# Patient Record
Sex: MALE | Race: WHITE | NOT HISPANIC OR LATINO | Employment: OTHER | URBAN - METROPOLITAN AREA
[De-identification: names, ages, dates, MRNs, and addresses within clinical notes are randomized per-mention and may not be internally consistent; named-entity substitution may affect disease eponyms.]

---

## 2023-04-11 LAB
ANION GAP IN SER/PLAS: 11 MMOL/L (ref 10–20)
BASOPHILS (10*3/UL) IN BLOOD BY AUTOMATED COUNT: 0.06 X10E9/L (ref 0–0.1)
BASOPHILS/100 LEUKOCYTES IN BLOOD BY AUTOMATED COUNT: 0.9 % (ref 0–2)
CALCIUM (MG/DL) IN SER/PLAS: 9.5 MG/DL (ref 8.6–10.3)
CARBON DIOXIDE, TOTAL (MMOL/L) IN SER/PLAS: 30 MMOL/L (ref 21–32)
CHLORIDE (MMOL/L) IN SER/PLAS: 101 MMOL/L (ref 98–107)
CREATININE (MG/DL) IN SER/PLAS: 1.27 MG/DL (ref 0.5–1.3)
EOSINOPHILS (10*3/UL) IN BLOOD BY AUTOMATED COUNT: 0.14 X10E9/L (ref 0–0.4)
EOSINOPHILS/100 LEUKOCYTES IN BLOOD BY AUTOMATED COUNT: 2 % (ref 0–6)
ERYTHROCYTE DISTRIBUTION WIDTH (RATIO) BY AUTOMATED COUNT: 13.2 % (ref 11.5–14.5)
ERYTHROCYTE MEAN CORPUSCULAR HEMOGLOBIN CONCENTRATION (G/DL) BY AUTOMATED: 34.5 G/DL (ref 32–36)
ERYTHROCYTE MEAN CORPUSCULAR VOLUME (FL) BY AUTOMATED COUNT: 91 FL (ref 80–100)
ERYTHROCYTES (10*6/UL) IN BLOOD BY AUTOMATED COUNT: 4.85 X10E12/L (ref 4.5–5.9)
GFR MALE: 56 ML/MIN/1.73M2
GLUCOSE (MG/DL) IN SER/PLAS: 109 MG/DL (ref 74–99)
HEMATOCRIT (%) IN BLOOD BY AUTOMATED COUNT: 44.1 % (ref 41–52)
HEMOGLOBIN (G/DL) IN BLOOD: 15.2 G/DL (ref 13.5–17.5)
IMMATURE GRANULOCYTES/100 LEUKOCYTES IN BLOOD BY AUTOMATED COUNT: 0.4 % (ref 0–0.9)
LEUKOCYTES (10*3/UL) IN BLOOD BY AUTOMATED COUNT: 6.9 X10E9/L (ref 4.4–11.3)
LYMPHOCYTES (10*3/UL) IN BLOOD BY AUTOMATED COUNT: 2.08 X10E9/L (ref 0.8–3)
LYMPHOCYTES/100 LEUKOCYTES IN BLOOD BY AUTOMATED COUNT: 30.3 % (ref 13–44)
MAGNESIUM (MG/DL) IN SER/PLAS: 2.03 MG/DL (ref 1.6–2.4)
MONOCYTES (10*3/UL) IN BLOOD BY AUTOMATED COUNT: 0.62 X10E9/L (ref 0.05–0.8)
MONOCYTES/100 LEUKOCYTES IN BLOOD BY AUTOMATED COUNT: 9 % (ref 2–10)
NATRIURETIC PEPTIDE B (PG/ML) IN SER/PLAS: 128 PG/ML (ref 0–99)
NEUTROPHILS (10*3/UL) IN BLOOD BY AUTOMATED COUNT: 3.94 X10E9/L (ref 1.6–5.5)
NEUTROPHILS/100 LEUKOCYTES IN BLOOD BY AUTOMATED COUNT: 57.4 % (ref 40–80)
NRBC (PER 100 WBCS) BY AUTOMATED COUNT: 0 /100 WBC (ref 0–0)
PLATELETS (10*3/UL) IN BLOOD AUTOMATED COUNT: 188 X10E9/L (ref 150–450)
POTASSIUM (MMOL/L) IN SER/PLAS: 3.8 MMOL/L (ref 3.5–5.3)
SODIUM (MMOL/L) IN SER/PLAS: 138 MMOL/L (ref 136–145)
UREA NITROGEN (MG/DL) IN SER/PLAS: 23 MG/DL (ref 6–23)

## 2023-09-21 PROBLEM — H26.9 CATARACT: Status: ACTIVE | Noted: 2023-09-21

## 2023-09-21 PROBLEM — R79.89 ELEVATED BRAIN NATRIURETIC PEPTIDE (BNP) LEVEL: Status: ACTIVE | Noted: 2023-09-21

## 2023-09-21 PROBLEM — I50.20: Status: ACTIVE | Noted: 2023-09-21

## 2023-09-21 PROBLEM — N40.0 BPH (BENIGN PROSTATIC HYPERPLASIA): Status: ACTIVE | Noted: 2023-09-21

## 2023-09-21 PROBLEM — I63.9 CEREBROVASCULAR ACCIDENT (CVA) (MULTI): Status: ACTIVE | Noted: 2023-09-21

## 2023-09-21 PROBLEM — I10 BENIGN ESSENTIAL HYPERTENSION: Status: ACTIVE | Noted: 2023-09-21

## 2023-09-21 PROBLEM — G47.00 INSOMNIA: Status: ACTIVE | Noted: 2023-09-21

## 2023-09-21 PROBLEM — I47.29 VENTRICULAR TACHYCARDIA, NONSUSTAINED (MULTI): Status: ACTIVE | Noted: 2023-09-21

## 2023-09-21 PROBLEM — E78.5 HYPERLIPIDEMIA: Status: ACTIVE | Noted: 2023-09-21

## 2023-09-21 PROBLEM — I48.20 CHRONIC ATRIAL FIBRILLATION (MULTI): Status: ACTIVE | Noted: 2023-09-21

## 2023-09-21 PROBLEM — R53.83 FATIGUE: Status: ACTIVE | Noted: 2023-09-21

## 2023-09-21 PROBLEM — R26.89 LOSS OF BALANCE: Status: ACTIVE | Noted: 2023-09-21

## 2023-09-21 PROBLEM — I95.89 IATROGENIC HYPOTENSION: Status: ACTIVE | Noted: 2023-09-21

## 2023-09-21 PROBLEM — E87.6 HYPOKALEMIA: Status: ACTIVE | Noted: 2023-09-21

## 2023-09-21 PROBLEM — I25.10 ATHEROSCLEROSIS OF NATIVE CORONARY ARTERY WITHOUT ANGINA PECTORIS: Status: ACTIVE | Noted: 2023-09-21

## 2023-09-21 PROBLEM — R00.1 BRADYCARDIA: Status: ACTIVE | Noted: 2023-09-21

## 2023-09-21 PROBLEM — N39.0 FREQUENT UTI: Status: ACTIVE | Noted: 2023-09-21

## 2023-09-21 PROBLEM — Z86.79 HISTORY OF ISCHEMIC CARDIOMYOPATHY: Status: ACTIVE | Noted: 2023-09-21

## 2023-09-21 RX ORDER — SPIRONOLACTONE 25 MG/1
1 TABLET ORAL DAILY
COMMUNITY
Start: 2022-05-19 | End: 2024-01-04 | Stop reason: SDUPTHER

## 2023-09-21 RX ORDER — TAMSULOSIN HYDROCHLORIDE 0.4 MG/1
0.4 CAPSULE ORAL DAILY
COMMUNITY

## 2023-09-21 RX ORDER — CARVEDILOL 3.12 MG/1
3.12 TABLET ORAL 2 TIMES DAILY
COMMUNITY
End: 2023-12-22 | Stop reason: SINTOL

## 2023-09-21 RX ORDER — NITROFURANTOIN MACROCRYSTALS 50 MG/1
50 CAPSULE ORAL NIGHTLY
COMMUNITY

## 2023-09-21 RX ORDER — CLOPIDOGREL BISULFATE 75 MG/1
1 TABLET ORAL DAILY
COMMUNITY

## 2023-09-21 RX ORDER — TORSEMIDE 20 MG/1
1 TABLET ORAL DAILY
COMMUNITY
Start: 2022-06-14 | End: 2024-03-22 | Stop reason: SDUPTHER

## 2023-09-21 RX ORDER — MELATONIN 10 MG
10 TABLET, SUBLINGUAL SUBLINGUAL
COMMUNITY
End: 2023-12-22 | Stop reason: WASHOUT

## 2023-09-21 RX ORDER — ACETAMINOPHEN, DIPHENHYDRAMINE HCL, PHENYLEPHRINE HCL 325; 25; 5 MG/1; MG/1; MG/1
10 TABLET ORAL NIGHTLY
COMMUNITY

## 2023-09-21 RX ORDER — SIMVASTATIN 20 MG/1
20 TABLET, FILM COATED ORAL NIGHTLY
COMMUNITY
Start: 2016-08-23

## 2023-09-21 RX ORDER — LOSARTAN POTASSIUM 25 MG/1
1 TABLET ORAL DAILY
COMMUNITY
Start: 2022-05-16 | End: 2024-01-04 | Stop reason: SDUPTHER

## 2023-09-21 RX ORDER — SERTRALINE HYDROCHLORIDE 100 MG/1
100 TABLET, FILM COATED ORAL EVERY MORNING
COMMUNITY
Start: 2016-08-23

## 2023-09-21 RX ORDER — NITROGLYCERIN 0.4 MG/1
0.4 TABLET SUBLINGUAL EVERY 5 MIN PRN
COMMUNITY
Start: 2021-08-19

## 2023-12-22 ENCOUNTER — OFFICE VISIT (OUTPATIENT)
Dept: CARDIOLOGY | Facility: CLINIC | Age: 83
End: 2023-12-22
Payer: MEDICARE

## 2023-12-22 VITALS
HEART RATE: 54 BPM | TEMPERATURE: 97.4 F | SYSTOLIC BLOOD PRESSURE: 98 MMHG | DIASTOLIC BLOOD PRESSURE: 70 MMHG | WEIGHT: 184.6 LBS | HEIGHT: 64 IN | BODY MASS INDEX: 31.51 KG/M2

## 2023-12-22 DIAGNOSIS — I50.42 CHRONIC COMBINED SYSTOLIC AND DIASTOLIC HEART FAILURE (MULTI): ICD-10-CM

## 2023-12-22 DIAGNOSIS — Z87.891 FORMER SMOKER: ICD-10-CM

## 2023-12-22 DIAGNOSIS — E78.2 MIXED HYPERLIPIDEMIA: ICD-10-CM

## 2023-12-22 DIAGNOSIS — I48.20 CHRONIC ATRIAL FIBRILLATION (MULTI): ICD-10-CM

## 2023-12-22 DIAGNOSIS — I25.10 ATHEROSCLEROSIS OF NATIVE CORONARY ARTERY OF NATIVE HEART WITHOUT ANGINA PECTORIS: Primary | ICD-10-CM

## 2023-12-22 DIAGNOSIS — R53.82 CHRONIC FATIGUE: ICD-10-CM

## 2023-12-22 DIAGNOSIS — R26.89 LOSS OF BALANCE: ICD-10-CM

## 2023-12-22 DIAGNOSIS — I10 BENIGN ESSENTIAL HYPERTENSION: ICD-10-CM

## 2023-12-22 DIAGNOSIS — Z79.01 CHRONIC ANTICOAGULATION: ICD-10-CM

## 2023-12-22 DIAGNOSIS — I95.89 IATROGENIC HYPOTENSION: ICD-10-CM

## 2023-12-22 PROCEDURE — 99214 OFFICE O/P EST MOD 30 MIN: CPT | Performed by: INTERNAL MEDICINE

## 2023-12-22 PROCEDURE — 3074F SYST BP LT 130 MM HG: CPT | Performed by: INTERNAL MEDICINE

## 2023-12-22 PROCEDURE — 1160F RVW MEDS BY RX/DR IN RCRD: CPT | Performed by: INTERNAL MEDICINE

## 2023-12-22 PROCEDURE — 1159F MED LIST DOCD IN RCRD: CPT | Performed by: INTERNAL MEDICINE

## 2023-12-22 PROCEDURE — 3078F DIAST BP <80 MM HG: CPT | Performed by: INTERNAL MEDICINE

## 2023-12-22 ASSESSMENT — ENCOUNTER SYMPTOMS
FEVER: 0
DYSURIA: 0
ARTHRALGIAS: 1
CONSTIPATION: 1
ACTIVITY CHANGE: 1
WEAKNESS: 1
COUGH: 0
ABDOMINAL DISTENTION: 1
WHEEZING: 1
DIFFICULTY URINATING: 1
BRUISES/BLEEDS EASILY: 1
FATIGUE: 1
PALPITATIONS: 0
STRIDOR: 0
ENDOCRINE NEGATIVE: 1
HEMATURIA: 1
APPETITE CHANGE: 0
SHORTNESS OF BREATH: 1
DIZZINESS: 1
SLEEP DISTURBANCE: 1

## 2023-12-22 ASSESSMENT — PATIENT HEALTH QUESTIONNAIRE - PHQ9
7. TROUBLE CONCENTRATING ON THINGS, SUCH AS READING THE NEWSPAPER OR WATCHING TELEVISION: SEVERAL DAYS
5. POOR APPETITE OR OVEREATING: NOT AT ALL
4. FEELING TIRED OR HAVING LITTLE ENERGY: NEARLY EVERY DAY
SUM OF ALL RESPONSES TO PHQ QUESTIONS 1-9: 17
2. FEELING DOWN, DEPRESSED OR HOPELESS: NEARLY EVERY DAY
8. MOVING OR SPEAKING SO SLOWLY THAT OTHER PEOPLE COULD HAVE NOTICED. OR THE OPPOSITE, BEING SO FIGETY OR RESTLESS THAT YOU HAVE BEEN MOVING AROUND A LOT MORE THAN USUAL: NEARLY EVERY DAY
1. LITTLE INTEREST OR PLEASURE IN DOING THINGS: NEARLY EVERY DAY
6. FEELING BAD ABOUT YOURSELF - OR THAT YOU ARE A FAILURE OR HAVE LET YOURSELF OR YOUR FAMILY DOWN: NOT AT ALL
10. IF YOU CHECKED OFF ANY PROBLEMS, HOW DIFFICULT HAVE THESE PROBLEMS MADE IT FOR YOU TO DO YOUR WORK, TAKE CARE OF THINGS AT HOME, OR GET ALONG WITH OTHER PEOPLE: NOT DIFFICULT AT ALL
SUM OF ALL RESPONSES TO PHQ9 QUESTIONS 1 AND 2: 6
3. TROUBLE FALLING OR STAYING ASLEEP OR SLEEPING TOO MUCH: NEARLY EVERY DAY
9. THOUGHTS THAT YOU WOULD BE BETTER OFF DEAD, OR OF HURTING YOURSELF: SEVERAL DAYS

## 2023-12-22 NOTE — PROGRESS NOTES
"  Patient:  Sravan Morin  YOB: 1940  MRN: [id number]       Chief Complaint/Active Symptoms:       Sravan Morin is a 83 y.o. male who returns today for cardiac follow-up.    Here for 8 month follow-up of chronic heart failure. Remains tired and feels \"drunk\" or foggy with his thoughts. Feels he is not alert or in a daze. This is constant unless he is laying in bed relaxing. Feels like also staggers when he walks.     No chest pain. Chronic stable SOB on exertion, no orthopnea or PND. No edema.     Review of Systems   Constitutional:  Positive for activity change and fatigue. Negative for appetite change and fever.   HENT:  Positive for congestion and hearing loss.    Respiratory:  Positive for shortness of breath and wheezing. Negative for cough and stridor.    Cardiovascular:  Negative for chest pain, palpitations and leg swelling.   Gastrointestinal:  Positive for abdominal distention and constipation.   Endocrine: Negative.    Genitourinary:  Positive for difficulty urinating and hematuria. Negative for dysuria.   Musculoskeletal:  Positive for arthralgias.   Neurological:  Positive for dizziness and weakness.   Hematological:  Bruises/bleeds easily.   Psychiatric/Behavioral:  Positive for sleep disturbance.    All other systems reviewed and are negative.      Objective:     Vitals:    12/22/23 0943   BP: 98/70   Pulse: 54   Temp: 36.3 °C (97.4 °F)       Vitals:    12/22/23 0943   Weight: 83.7 kg (184 lb 9.6 oz)       Allergies:     Allergies   Allergen Reactions    Penicillins Hives     70 years ago    Sulfamethoxazole-Trimethoprim Other     Caused liver damage          Medications:     Current Outpatient Medications   Medication Instructions    apixaban (ELIQUIS) 5 mg, oral, Every 12 hours    carvedilol (COREG) 3.125 mg, oral, 2 times daily    clopidogrel (Plavix) 75 mg tablet 1 tablet, oral, Daily    famotidine (PEPCID ORAL) oral    L. acidophilus/Bifid. animalis 15.5 billion cell capsule 1 " Capful, oral, Every morning    L. acidophilus/Bifid. animalis 32 billion cell capsule 1 capsule, oral, PER DIRECTED    losartan (Cozaar) 25 mg tablet 1 tablet, oral, Daily    melatonin 10 mg, oral, Nightly    melatonin 10 mg, sublingual, TAKE PER DIRECTED 30 MIN BEFORE BED    nitrofurantoin (MACRODANTIN) 50 mg, oral, Nightly, TAKE PER DIRECTED    nitroglycerin (NITROSTAT) 0.4 mg, sublingual, Every 5 min PRN, CALL 911 IF PERSISTS   PER DIRECTED    raNITIdine (ZANTAC) 150 mg, oral, Nightly    sertraline (ZOLOFT) 100 mg, oral, Every morning    simvastatin (ZOCOR) 20 mg, oral, Nightly    spironolactone (Aldactone) 25 mg tablet 1 tablet, oral, Daily    tamsulosin (FLOMAX) 0.4 mg, oral, Daily, TAKE PER DIRECTED    torsemide (Demadex) 20 mg tablet 1 tablet, oral, Daily       Physical Examination:   GENERAL:  Well developed, well nourished, in no acute distress.  HEENT: NC AT, EOMI with anicteric sclera  NECK: Reduced range of motion no JVD, no bruit.  CHEST:  Symmetric and nontender.  LUNGS:  Clear to auscultation bilaterally, normal respiratory effort.  HEART: PMI is nondisplaced.  There is an irregularly irregular rhythm with controlled rate, normal S1 and S2 without S3.  There are no carotid bruits.  No peripheral edema with preserved distal pulses.  ABDOMEN: Soft, NT, ND without palpable organomegaly or bruits  EXTREMITIES:  Warm with good color, no clubbing or cyanosis.  There is no edema noted.  PERIPHERAL VASCULAR:  Pulses present and equally palpable  MUSCULOSKELETAL: Osteoarthritic changes  NEURO/PSYCH:  Alert and oriented times three with approppriate behavior and responses. Nonfocal motor examination with balance abnormalities lymph: No significant palpable lymphadenopathy  Skin: no rash or lesions on exposed skin or reported.    Lab:     CBC:   Lab Results   Component Value Date    WBC 6.9 04/11/2023    RBC 4.85 04/11/2023    HGB 15.2 04/11/2023    HCT 44.1 04/11/2023     04/11/2023        CMP:    Lab  "Results   Component Value Date     04/11/2023    K 3.8 04/11/2023     04/11/2023    CO2 30 04/11/2023    BUN 23 04/11/2023    CREATININE 1.27 04/11/2023    GLUCOSE 109 (H) 04/11/2023    CALCIUM 9.5 04/11/2023       Magnesium:    Lab Results   Component Value Date    MG 2.03 04/11/2023       Lipid Profile:    Lab Results   Component Value Date    TRIG 77 11/30/2022    HDL 48.6 11/30/2022       TSH:    Lab Results   Component Value Date    TSH 1.14 11/10/2020       BNP:   Lab Results   Component Value Date     (H) 04/11/2023        PT/INR:    Lab Results   Component Value Date    PROTIME 15.9 (H) 11/29/2022    INR 1.4 (H) 11/29/2022       HgBA1c:    Lab Results   Component Value Date    HGBA1C 5.2 11/30/2022       BMP:  Lab Results   Component Value Date     04/11/2023     11/30/2022     11/29/2022    K 3.8 04/11/2023    K 3.9 11/30/2022    K 3.8 11/29/2022     04/11/2023     11/30/2022     11/29/2022    CO2 30 04/11/2023    CO2 25 11/30/2022    CO2 28 11/29/2022    BUN 23 04/11/2023    BUN 15 11/30/2022    BUN 19 11/29/2022    CREATININE 1.27 04/11/2023    CREATININE 1.06 11/30/2022    CREATININE 1.34 (H) 11/29/2022       Cardiac Enzymes:    Lab Results   Component Value Date    TROPHS 34 (H) 11/29/2022    TROPHS 34 (H) 11/29/2022    TROPHS 39 (H) 11/29/2022       Hepatic Function Panel:    Lab Results   Component Value Date    ALKPHOS 91 11/29/2022    ALT 20 11/29/2022    AST 33 11/29/2022    PROT 7.2 11/29/2022    BILITOT 1.0 11/29/2022         Diagnostic Studies:     No echocardiogram results found for the past 12 months    No nuclear medicine results found for the past 12 months    No valid procedures specified.    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  Chronic combined systolic and diastolic congestive heart failure.  Compensated with borderline blood pressure readings which have been a problem throughout his treatment.  For now we will try moving his tamsulosin to bedtime to see if that helps with his symptoms if not we may need to discontinue the losartan therapy.  2.  Coronary artery disease stable without angina pectoris.  Please continue with risk factor modification.  Not on aspirin therapy due to the patient's chronic anticoagulation.  Will stop his Plavix 1 year after his stents.  3.  History of hypertension.  The patient does not have hypertension anymore he is intolerant to many medications.  Unfortunately due to his low blood pressure readings he is not able to tolerate some of the therapy we would prefer to give him for the treatment of his heart failure.  4.  Hyperlipidemia.  Continue statin therapy.  5.  Iatrogenic hypotension.  Due to the medications.  Will try moving his tamsulosin but we may be forced to if he continues to be at risk for falls to discontinue the losartan therapy.  6.  Fatigue and loss of balance.  I think his fatigue is multifactorial and the loss of balance sounds like it could be due to orthostatic hypotension and his resting systolic hypotension.  Please see the above recommendations.  7.  Tobacco and weight status.  The patient does not smoke cigarettes and he has recently stopped his chewing tobacco.  We reminded him to be tobacco free.    I will see the patient in follow-up in 3 months but I have asked he and his family to call me if he remains hypotensive and having difficulty after moving his medications as I discussed above.

## 2023-12-22 NOTE — PATIENT INSTRUCTIONS
Take tamsulosin at bedtime  Check blood pressure 2-3 times a week at lunchtime - bring readings to your next appointment. Call me if your blood pressure readings are always < 100  Return in 3 months

## 2024-01-04 DIAGNOSIS — I50.20 HEART FAILURE WITH REDUCED EJECTION FRACTION, NYHA CLASS III (MULTI): Primary | ICD-10-CM

## 2024-01-05 RX ORDER — LOSARTAN POTASSIUM 25 MG/1
25 TABLET ORAL DAILY
Qty: 90 TABLET | Refills: 1 | Status: SHIPPED | OUTPATIENT
Start: 2024-01-05 | End: 2024-07-03

## 2024-01-05 RX ORDER — SPIRONOLACTONE 25 MG/1
25 TABLET ORAL DAILY
Qty: 90 TABLET | Refills: 1 | Status: SHIPPED | OUTPATIENT
Start: 2024-01-05 | End: 2024-07-03

## 2024-01-29 ENCOUNTER — TELEPHONE (OUTPATIENT)
Dept: CARDIOLOGY | Facility: CLINIC | Age: 84
End: 2024-01-29
Payer: MEDICARE

## 2024-01-29 RX ORDER — CLOPIDOGREL BISULFATE 75 MG/1
75 TABLET ORAL DAILY
OUTPATIENT
Start: 2024-01-29

## 2024-01-29 NOTE — TELEPHONE ENCOUNTER
----- Message from Maria D Wilson MD sent at 1/29/2024  4:38 PM EST -----  Patient has completed > 1 year of plavix. Does not need to continue while he is on eliquis. Did not renew. May stop when current supply gone.

## 2024-02-19 DIAGNOSIS — I48.20 CHRONIC ATRIAL FIBRILLATION (MULTI): Primary | ICD-10-CM

## 2024-03-22 ENCOUNTER — OFFICE VISIT (OUTPATIENT)
Dept: CARDIOLOGY | Facility: CLINIC | Age: 84
End: 2024-03-22
Payer: MEDICARE

## 2024-03-22 VITALS
HEIGHT: 64 IN | BODY MASS INDEX: 31.62 KG/M2 | SYSTOLIC BLOOD PRESSURE: 140 MMHG | WEIGHT: 185.2 LBS | DIASTOLIC BLOOD PRESSURE: 66 MMHG | HEART RATE: 52 BPM | TEMPERATURE: 97.3 F

## 2024-03-22 DIAGNOSIS — I25.10 ATHEROSCLEROSIS OF NATIVE CORONARY ARTERY OF NATIVE HEART WITHOUT ANGINA PECTORIS: Primary | ICD-10-CM

## 2024-03-22 DIAGNOSIS — I50.20 HEART FAILURE WITH REDUCED EJECTION FRACTION, NYHA CLASS III (MULTI): ICD-10-CM

## 2024-03-22 DIAGNOSIS — I48.20 CHRONIC ATRIAL FIBRILLATION (MULTI): ICD-10-CM

## 2024-03-22 DIAGNOSIS — I95.1 CHRONIC ORTHOSTATIC HYPOTENSION: ICD-10-CM

## 2024-03-22 DIAGNOSIS — I50.42 CHRONIC COMBINED SYSTOLIC AND DIASTOLIC HEART FAILURE (MULTI): ICD-10-CM

## 2024-03-22 DIAGNOSIS — E78.2 MIXED HYPERLIPIDEMIA: ICD-10-CM

## 2024-03-22 DIAGNOSIS — Z79.01 CHRONIC ANTICOAGULATION: ICD-10-CM

## 2024-03-22 PROCEDURE — 1159F MED LIST DOCD IN RCRD: CPT | Performed by: INTERNAL MEDICINE

## 2024-03-22 PROCEDURE — 1160F RVW MEDS BY RX/DR IN RCRD: CPT | Performed by: INTERNAL MEDICINE

## 2024-03-22 PROCEDURE — 1157F ADVNC CARE PLAN IN RCRD: CPT | Performed by: INTERNAL MEDICINE

## 2024-03-22 PROCEDURE — 99214 OFFICE O/P EST MOD 30 MIN: CPT | Performed by: INTERNAL MEDICINE

## 2024-03-22 PROCEDURE — 93000 ELECTROCARDIOGRAM COMPLETE: CPT | Performed by: INTERNAL MEDICINE

## 2024-03-22 RX ORDER — TORSEMIDE 20 MG/1
20 TABLET ORAL DAILY
Qty: 135 TABLET | Refills: 3 | Status: SHIPPED | OUTPATIENT
Start: 2024-03-22 | End: 2025-03-22

## 2024-03-22 ASSESSMENT — ENCOUNTER SYMPTOMS
NERVOUS/ANXIOUS: 1
COUGH: 0
SHORTNESS OF BREATH: 1
WEAKNESS: 1
ACTIVITY CHANGE: 1
ARTHRALGIAS: 1
PALPITATIONS: 0
DIFFICULTY URINATING: 1
FATIGUE: 1

## 2024-03-22 ASSESSMENT — PATIENT HEALTH QUESTIONNAIRE - PHQ9
SUM OF ALL RESPONSES TO PHQ9 QUESTIONS 1 AND 2: 2
10. IF YOU CHECKED OFF ANY PROBLEMS, HOW DIFFICULT HAVE THESE PROBLEMS MADE IT FOR YOU TO DO YOUR WORK, TAKE CARE OF THINGS AT HOME, OR GET ALONG WITH OTHER PEOPLE: SOMEWHAT DIFFICULT
2. FEELING DOWN, DEPRESSED OR HOPELESS: SEVERAL DAYS
1. LITTLE INTEREST OR PLEASURE IN DOING THINGS: SEVERAL DAYS

## 2024-03-22 NOTE — PROGRESS NOTES
"  Patient:  Sravan Morin  YOB: 1940  MRN: [id number]       Chief Complaint/Active Symptoms:       Sravan Morin is a 83 y.o. male who returns today for cardiac follow-up.    Here for routine follow-up. No chest pain or discomfort. Still tires easily and has SOB that is unchanged from his baseline. No orthopnea or PND. No edema. No dizziness or lightheadedness. Does feel off balance at times. Constantly feels like he is in a \"haze\", almost like he is slightly drunk. 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 No LOC.     BP readings are all over the map - either 110 systolic or low 140's systolic and in between - nothing higher.       Review of Systems   Constitutional:  Positive for activity change and fatigue.   HENT:  Positive for congestion and hearing loss.    Respiratory:  Positive for shortness of breath. Negative for cough.    Cardiovascular:  Negative for chest pain, palpitations and leg swelling.   Genitourinary:  Positive for difficulty urinating.   Musculoskeletal:  Positive for arthralgias and gait problem.   Neurological:  Positive for weakness.   Psychiatric/Behavioral:  The patient is nervous/anxious.    All other systems reviewed and are negative.      Objective:     Vitals:    03/22/24 1041   BP: 140/66   Pulse: 52   Temp: 36.3 °C (97.3 °F)       Allergies:     Allergies   Allergen Reactions    Penicillins Hives     70 years ago    Sulfamethoxazole-Trimethoprim Other     Caused liver damage          Medications:     Current Outpatient Medications   Medication Instructions    apixaban (ELIQUIS) 5 mg, oral, Every 12 hours    clopidogrel (Plavix) 75 mg tablet 1 tablet, oral, Daily    famotidine (PEPCID ORAL) 1 tablet, oral, Daily    L. acidophilus/Bifid. animalis 32 billion cell capsule 1 capsule, oral, PER DIRECTED    losartan (COZAAR) 25 mg, oral, Daily    melatonin 10 mg, oral, Nightly    nitrofurantoin (MACRODANTIN) 50 mg, " oral, Nightly, TAKE PER DIRECTED    nitroglycerin (NITROSTAT) 0.4 mg, sublingual, Every 5 min PRN, CALL 911 IF PERSISTS   PER DIRECTED    sertraline (ZOLOFT) 100 mg, oral, Every morning    simvastatin (ZOCOR) 20 mg, oral, Nightly    spironolactone (ALDACTONE) 25 mg, oral, Daily    tamsulosin (FLOMAX) 0.4 mg, oral, Daily, TAKE PER DIRECTED    torsemide (DEMADEX) 20 mg, oral, Daily, Take 1 tablet (20 mg) by mouth once daily. May take 2nd dose daily for > 2 lb weight gain or worsening lower extremity edema.       Physical Examination:   GENERAL:  Well developed, well nourished, in no acute distress.  HEENT: NC AT, EOMI with anicteric sclera  NECK: Reduced range of motion, no JVD, no bruit.  CHEST:  Symmetric and nontender.  LUNGS:  Clear to auscultation bilaterally, normal respiratory effort.  HEART: PMI is nonpalpable.  There is a irregular rhythm with a normal rate.  Normal S1 and S2 without S3.  There is a systolic type ejection murmur at the upper sternal border more holosystolic at the lower left sternal border.  There is trace to mild ankle edema in the left greater than right.  ABDOMEN: Soft, NT, ND without palpable organomegaly or bruits  EXTREMITIES:  Warm with good color, no clubbing or cyanosis.  There is trace to mild edema noted.  PERIPHERAL VASCULAR:  Pulses present and equally palpable  MUSCULOSKELETAL: Osteoarthritic changes  NEURO/PSYCH:  Alert and oriented times three with approppriate behavior and responses. Nonfocal motor examination with normal gait and ambulation  Lymph: No significant palpable lymphadenopathy  Skin: no rash or lesions on exposed skin or reported.    Lab:     CBC:   Lab Results   Component Value Date    WBC 6.9 04/11/2023    RBC 4.85 04/11/2023    HGB 15.2 04/11/2023    HCT 44.1 04/11/2023     04/11/2023        CMP:    Lab Results   Component Value Date     04/11/2023    K 3.8 04/11/2023     04/11/2023    CO2 30 04/11/2023    BUN 23 04/11/2023    CREATININE 1.27  04/11/2023    GLUCOSE 109 (H) 04/11/2023    CALCIUM 9.5 04/11/2023       Magnesium:    Lab Results   Component Value Date    MG 2.03 04/11/2023       Lipid Profile:    Lab Results   Component Value Date    TRIG 77 11/30/2022    HDL 48.6 11/30/2022       TSH:    Lab Results   Component Value Date    TSH 1.14 11/10/2020       BNP:   Lab Results   Component Value Date     (H) 04/11/2023        PT/INR:    Lab Results   Component Value Date    PROTIME 15.9 (H) 11/29/2022    INR 1.4 (H) 11/29/2022       HgBA1c:    Lab Results   Component Value Date    HGBA1C 5.2 11/30/2022       BMP:  Lab Results   Component Value Date     04/11/2023     11/30/2022     11/29/2022    K 3.8 04/11/2023    K 3.9 11/30/2022    K 3.8 11/29/2022     04/11/2023     11/30/2022     11/29/2022    CO2 30 04/11/2023    CO2 25 11/30/2022    CO2 28 11/29/2022    BUN 23 04/11/2023    BUN 15 11/30/2022    BUN 19 11/29/2022    CREATININE 1.27 04/11/2023    CREATININE 1.06 11/30/2022    CREATININE 1.34 (H) 11/29/2022       Cardiac Enzymes:    Lab Results   Component Value Date    TROPHS 34 (H) 11/29/2022    TROPHS 34 (H) 11/29/2022    TROPHS 39 (H) 11/29/2022       Hepatic Function Panel:    Lab Results   Component Value Date    ALKPHOS 91 11/29/2022    ALT 20 11/29/2022    AST 33 11/29/2022    PROT 7.2 11/29/2022    BILITOT 1.0 11/29/2022     No recent labs for review.  Ordered labs.    Diagnostic Studies:     No echocardiogram results found for the past 12 months  Echo February 2022 showed an EF of 25%.  Repeat echocardiogram in June 2022 showed an EF of 45%  No nuclear medicine results found for the past 12 months  Last nuclear stress test was in 2020 with normal perfusion and an EF of 51%  No valid procedures specified.  Cardiac cath in February 2022 at [facility] had single-vessel disease at that time with PCI.  Heart catheterization never available for review for type of vessel believe it was  "the left circumflex distribution but cannot be confirmed  EKG:   No results found for: \"EKG\"  EKG today shows atrial fibrillation with PVC versus aberrant complexes, left axis deviation, LVH with QRS widening or incomplete left bundle  Radiology:     No orders to display     ASSESSMENT     Problem List Items Addressed This Visit       Atherosclerosis of native coronary artery without angina pectoris - Primary    Relevant Orders    CBC and Auto Differential    Chronic atrial fibrillation (CMS/HCC)    Relevant Orders    Thyroid Stimulating Hormone    Heart failure with reduced ejection fraction, NYHA class III (CMS/HCC)    Relevant Medications    torsemide (Demadex) 20 mg tablet    Other Relevant Orders    ECG 12 lead (Clinic Performed)    Comprehensive Metabolic Panel    Magnesium    Thyroid Stimulating Hormone    Hyperlipidemia    Relevant Orders    Comprehensive Metabolic Panel    Lipid Panel    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  Chronic systolic heart failure.  Patient is stable at this time and compensated.  He adjust his diuretic regimen as if he tries to take a higher dose on a regular basis he becomes orthostatic and has falls.  Will continue the current regimen.  2.  Coronary artery disease with history of coronary stenting stable without angina pectoris.  We never did get a copy of the heart catheterization from [facility] but he did have single-vessel disease with PCI.  Will continue with conservative medical therapy and risk factor modification.  3.  Permanent atrial fibrillation on chronic anticoagulation.  No bleeding problems or complications would follow him clinically.  4.  Mixed hyperlipidemia.  Continue statin therapy requested a lipid panel.  5.  Chronic orthostatic hypotension.  Need to be cautious about aggressive diuretic " therapy.  After a couple of years patient knows what to do to adjust his medications we reminded him to pay close attention.  6.  Tobacco weight status.  The patient is a current non-smoker BMI is 31 we have encouraged heart healthy diet.    As long as he stable we will see him in the office in follow-up in 6 months we will see him sooner if there is any questions or concerns

## 2024-06-28 ENCOUNTER — APPOINTMENT (OUTPATIENT)
Dept: CARDIOLOGY | Facility: CLINIC | Age: 84
End: 2024-06-28
Payer: MEDICARE

## 2024-07-15 ENCOUNTER — APPOINTMENT (OUTPATIENT)
Dept: CARDIOLOGY | Facility: CLINIC | Age: 84
End: 2024-07-15
Payer: MEDICARE

## 2024-08-06 DIAGNOSIS — I50.20 HEART FAILURE WITH REDUCED EJECTION FRACTION, NYHA CLASS III (MULTI): ICD-10-CM

## 2024-08-06 DIAGNOSIS — I48.20 CHRONIC ATRIAL FIBRILLATION (MULTI): ICD-10-CM

## 2024-08-06 RX ORDER — SPIRONOLACTONE 25 MG/1
25 TABLET ORAL DAILY
Qty: 90 TABLET | Refills: 1 | Status: SHIPPED | OUTPATIENT
Start: 2024-08-06 | End: 2025-02-02

## 2024-08-16 ENCOUNTER — APPOINTMENT (OUTPATIENT)
Dept: CARDIOLOGY | Facility: CLINIC | Age: 84
End: 2024-08-16
Payer: MEDICARE

## 2024-09-05 ENCOUNTER — APPOINTMENT (OUTPATIENT)
Dept: CARDIOLOGY | Facility: CLINIC | Age: 84
End: 2024-09-05
Payer: MEDICARE

## 2024-09-05 ENCOUNTER — LAB (OUTPATIENT)
Dept: LAB | Facility: LAB | Age: 84
End: 2024-09-05
Payer: MEDICARE

## 2024-09-05 VITALS
SYSTOLIC BLOOD PRESSURE: 124 MMHG | HEIGHT: 64 IN | HEART RATE: 51 BPM | BODY MASS INDEX: 32.1 KG/M2 | WEIGHT: 188 LBS | DIASTOLIC BLOOD PRESSURE: 76 MMHG

## 2024-09-05 DIAGNOSIS — I50.42 CHRONIC COMBINED SYSTOLIC AND DIASTOLIC HEART FAILURE (MULTI): ICD-10-CM

## 2024-09-05 DIAGNOSIS — Z79.01 CHRONIC ANTICOAGULATION: ICD-10-CM

## 2024-09-05 DIAGNOSIS — E78.2 MIXED HYPERLIPIDEMIA: ICD-10-CM

## 2024-09-05 DIAGNOSIS — I48.20 CHRONIC ATRIAL FIBRILLATION (MULTI): ICD-10-CM

## 2024-09-05 DIAGNOSIS — I95.1 CHRONIC ORTHOSTATIC HYPOTENSION: ICD-10-CM

## 2024-09-05 DIAGNOSIS — I50.20 HEART FAILURE WITH REDUCED EJECTION FRACTION, NYHA CLASS III (MULTI): ICD-10-CM

## 2024-09-05 DIAGNOSIS — E87.6 HYPOKALEMIA: ICD-10-CM

## 2024-09-05 DIAGNOSIS — R53.82 CHRONIC FATIGUE: ICD-10-CM

## 2024-09-05 DIAGNOSIS — I25.10 ATHEROSCLEROSIS OF NATIVE CORONARY ARTERY OF NATIVE HEART WITHOUT ANGINA PECTORIS: Primary | ICD-10-CM

## 2024-09-05 DIAGNOSIS — I47.29 VENTRICULAR TACHYCARDIA, NONSUSTAINED (MULTI): ICD-10-CM

## 2024-09-05 DIAGNOSIS — I25.10 ATHEROSCLEROSIS OF NATIVE CORONARY ARTERY OF NATIVE HEART WITHOUT ANGINA PECTORIS: ICD-10-CM

## 2024-09-05 LAB
ALBUMIN SERPL BCP-MCNC: 4.1 G/DL (ref 3.4–5)
ALP SERPL-CCNC: 96 U/L (ref 33–136)
ALT SERPL W P-5'-P-CCNC: 16 U/L (ref 10–52)
ANION GAP SERPL CALC-SCNC: 13 MMOL/L (ref 10–20)
AST SERPL W P-5'-P-CCNC: 22 U/L (ref 9–39)
BASOPHILS # BLD AUTO: 0.07 X10*3/UL (ref 0–0.1)
BASOPHILS NFR BLD AUTO: 1 %
BILIRUB SERPL-MCNC: 1 MG/DL (ref 0–1.2)
BUN SERPL-MCNC: 20 MG/DL (ref 6–23)
CALCIUM SERPL-MCNC: 9.5 MG/DL (ref 8.6–10.3)
CHLORIDE SERPL-SCNC: 104 MMOL/L (ref 98–107)
CHOLEST SERPL-MCNC: 163 MG/DL (ref 0–199)
CHOLESTEROL/HDL RATIO: 2.9
CO2 SERPL-SCNC: 27 MMOL/L (ref 21–32)
CREAT SERPL-MCNC: 1.22 MG/DL (ref 0.5–1.3)
EGFRCR SERPLBLD CKD-EPI 2021: 58 ML/MIN/1.73M*2
EOSINOPHIL # BLD AUTO: 0.18 X10*3/UL (ref 0–0.4)
EOSINOPHIL NFR BLD AUTO: 2.5 %
ERYTHROCYTE [DISTWIDTH] IN BLOOD BY AUTOMATED COUNT: 13.7 % (ref 11.5–14.5)
GLUCOSE SERPL-MCNC: 115 MG/DL (ref 74–99)
HCT VFR BLD AUTO: 46.9 % (ref 41–52)
HDLC SERPL-MCNC: 56.3 MG/DL
HGB BLD-MCNC: 16.3 G/DL (ref 13.5–17.5)
IMM GRANULOCYTES # BLD AUTO: 0.04 X10*3/UL (ref 0–0.5)
IMM GRANULOCYTES NFR BLD AUTO: 0.6 % (ref 0–0.9)
LDLC SERPL CALC-MCNC: 86 MG/DL
LYMPHOCYTES # BLD AUTO: 1.65 X10*3/UL (ref 0.8–3)
LYMPHOCYTES NFR BLD AUTO: 22.8 %
MAGNESIUM SERPL-MCNC: 1.96 MG/DL (ref 1.6–2.4)
MCH RBC QN AUTO: 32.2 PG (ref 26–34)
MCHC RBC AUTO-ENTMCNC: 34.8 G/DL (ref 32–36)
MCV RBC AUTO: 93 FL (ref 80–100)
MONOCYTES # BLD AUTO: 0.65 X10*3/UL (ref 0.05–0.8)
MONOCYTES NFR BLD AUTO: 9 %
NEUTROPHILS # BLD AUTO: 4.66 X10*3/UL (ref 1.6–5.5)
NEUTROPHILS NFR BLD AUTO: 64.1 %
NON HDL CHOLESTEROL: 107 MG/DL (ref 0–149)
NRBC BLD-RTO: 0 /100 WBCS (ref 0–0)
PLATELET # BLD AUTO: 210 X10*3/UL (ref 150–450)
POTASSIUM SERPL-SCNC: 4.5 MMOL/L (ref 3.5–5.3)
PROT SERPL-MCNC: 7.4 G/DL (ref 6.4–8.2)
RBC # BLD AUTO: 5.06 X10*6/UL (ref 4.5–5.9)
SODIUM SERPL-SCNC: 139 MMOL/L (ref 136–145)
TRIGL SERPL-MCNC: 103 MG/DL (ref 0–149)
TSH SERPL-ACNC: 1.35 MIU/L (ref 0.44–3.98)
VLDL: 21 MG/DL (ref 0–40)
WBC # BLD AUTO: 7.3 X10*3/UL (ref 4.4–11.3)

## 2024-09-05 PROCEDURE — 4004F PT TOBACCO SCREEN RCVD TLK: CPT | Performed by: INTERNAL MEDICINE

## 2024-09-05 PROCEDURE — 84443 ASSAY THYROID STIM HORMONE: CPT

## 2024-09-05 PROCEDURE — 85025 COMPLETE CBC W/AUTO DIFF WBC: CPT

## 2024-09-05 PROCEDURE — 80053 COMPREHEN METABOLIC PANEL: CPT

## 2024-09-05 PROCEDURE — 36415 COLL VENOUS BLD VENIPUNCTURE: CPT

## 2024-09-05 PROCEDURE — 1160F RVW MEDS BY RX/DR IN RCRD: CPT | Performed by: INTERNAL MEDICINE

## 2024-09-05 PROCEDURE — 99214 OFFICE O/P EST MOD 30 MIN: CPT | Performed by: INTERNAL MEDICINE

## 2024-09-05 PROCEDURE — 80061 LIPID PANEL: CPT

## 2024-09-05 PROCEDURE — 1159F MED LIST DOCD IN RCRD: CPT | Performed by: INTERNAL MEDICINE

## 2024-09-05 PROCEDURE — 83735 ASSAY OF MAGNESIUM: CPT

## 2024-09-05 PROCEDURE — 1157F ADVNC CARE PLAN IN RCRD: CPT | Performed by: INTERNAL MEDICINE

## 2024-09-05 ASSESSMENT — ENCOUNTER SYMPTOMS
BACK PAIN: 1
BRUISES/BLEEDS EASILY: 1
DIZZINESS: 1
SLEEP DISTURBANCE: 1
HEMATURIA: 0
DIFFICULTY URINATING: 1
ABDOMINAL DISTENTION: 1
ARTHRALGIAS: 1
ACTIVITY CHANGE: 1
PALPITATIONS: 0
SHORTNESS OF BREATH: 1
CONSTIPATION: 1
DYSURIA: 0
COUGH: 0
WEAKNESS: 1
FATIGUE: 1

## 2024-09-05 NOTE — PROGRESS NOTES
Patient:  Sravan Morin  YOB: 1940  MRN: [id number]       Chief Complaint/Active Symptoms:       Sravan Morin is a 84 y.o. male who returns today for cardiac follow-up.    Patient comes here with his daughter for cardiovascular follow-up.  He has been feeling tired a lot lately.  He has also become very sedentary.  He has some chronic shortness of breath on exertion and fatigue.  No angina.  No palpitations.  He has occasional dizziness or lightheadedness with standing.  No syncope or falls.  No chest pain or angina.    Activities are limited by his fatigue and arthritic complaints.  No noted blood in the urine or stool.  Is unaware of having any lab work in the past 6 months to a year.  His primary care physician is now with [facility].  We cannot identify any lab work from that institution unfortunately.      Review of Systems   Constitutional:  Positive for activity change and fatigue.   HENT:  Positive for congestion and hearing loss.    Respiratory:  Positive for shortness of breath. Negative for cough.    Cardiovascular:  Negative for chest pain, palpitations and leg swelling.   Gastrointestinal:  Positive for abdominal distention and constipation.   Genitourinary:  Positive for difficulty urinating. Negative for dysuria, hematuria, penile discharge and penile swelling.   Musculoskeletal:  Positive for arthralgias, back pain and gait problem.   Neurological:  Positive for dizziness and weakness. Negative for syncope.   Hematological:  Bruises/bleeds easily.   Psychiatric/Behavioral:  Positive for sleep disturbance.    All other systems reviewed and are negative.      Objective:     Vitals:    09/05/24 1106   BP: 124/76   Pulse: 51       Vitals:    09/05/24 1106   Weight: 85.3 kg (188 lb)       Allergies:     Allergies   Allergen Reactions    Penicillins Hives     70 years ago    Sulfamethoxazole-Trimethoprim Other     Caused liver damage          Medications:     Current  Outpatient Medications   Medication Instructions    apixaban (ELIQUIS) 5 mg, oral, Every 12 hours    clopidogrel (Plavix) 75 mg tablet 1 tablet, oral, Daily    famotidine (PEPCID ORAL) 1 tablet, oral, Daily    L. acidophilus/Bifid. animalis 32 billion cell capsule 1 capsule, oral, PER DIRECTED    losartan (COZAAR) 25 mg, oral, Daily    melatonin 10 mg, oral, Nightly    nitrofurantoin (MACRODANTIN) 50 mg, oral, Nightly, TAKE PER DIRECTED    nitroglycerin (NITROSTAT) 0.4 mg, sublingual, Every 5 min PRN, CALL 911 IF PERSISTS   PER DIRECTED    sertraline (ZOLOFT) 100 mg, oral, Every morning    simvastatin (ZOCOR) 20 mg, oral, Nightly    spironolactone (ALDACTONE) 25 mg, oral, Daily    tamsulosin (FLOMAX) 0.4 mg, oral, Daily, TAKE PER DIRECTED    torsemide (DEMADEX) 20 mg, oral, Daily, Take 1 tablet (20 mg) by mouth once daily. May take 2nd dose daily for > 2 lb weight gain or worsening lower extremity edema.       Physical Examination:   GENERAL:  Well developed, chronically ill-appearing gentleman in no acute distress  HEENT: NC AT, EOMI with anicteric sclera  NECK: Reduced range of motion, no JVD, no bruit.  CHEST:  Symmetric and nontender.  LUNGS: Nonlabored respirations with diminished breath sounds at the bases and a few scattered rhonchi without rales.  HEART: PMI is laterally displaced.  There is an irregularly irregular rhythm with a controlled rate.  Normal S1 and S2 without S3.  Systolic ejection murmur along the upper sternal border without diastolic murmur.  There is trace pedal edema with normal distal perfusion.  ABDOMEN: Soft, NT, ND without palpable organomegaly or bruits  EXTREMITIES:  Warm with good color, no clubbing or cyanosis.  There is trace pedal edema noted.  PERIPHERAL VASCULAR:  Pulses present and equally palpable..  MUSCULOSKELETAL: Arthritic changes  NEURO/PSYCH:  Alert and oriented times three with approppriate behavior and responses. Nonfocal motor examination with normal gait and  ambulation  Skin: no rash or lesions on exposed skin or reported.    Lab:     CBC:   Lab Results   Component Value Date    WBC 6.9 04/11/2023    RBC 4.85 04/11/2023    HGB 15.2 04/11/2023    HCT 44.1 04/11/2023     04/11/2023        CMP:    Lab Results   Component Value Date     04/11/2023    K 3.8 04/11/2023     04/11/2023    CO2 30 04/11/2023    BUN 23 04/11/2023    CREATININE 1.27 04/11/2023    GLUCOSE 109 (H) 04/11/2023    CALCIUM 9.5 04/11/2023       Magnesium:    Lab Results   Component Value Date    MG 2.03 04/11/2023       Lipid Profile:    Lab Results   Component Value Date    TRIG 77 11/30/2022    HDL 48.6 11/30/2022       TSH:    Lab Results   Component Value Date    TSH 1.14 11/10/2020       BNP:   Lab Results   Component Value Date     (H) 04/11/2023        PT/INR:    Lab Results   Component Value Date    PROTIME 15.9 (H) 11/29/2022    INR 1.4 (H) 11/29/2022       HgBA1c:    Lab Results   Component Value Date    HGBA1C 5.2 11/30/2022       BMP:  Lab Results   Component Value Date     04/11/2023     11/30/2022     11/29/2022    K 3.8 04/11/2023    K 3.9 11/30/2022    K 3.8 11/29/2022     04/11/2023     11/30/2022     11/29/2022    CO2 30 04/11/2023    CO2 25 11/30/2022    CO2 28 11/29/2022    BUN 23 04/11/2023    BUN 15 11/30/2022    BUN 19 11/29/2022    CREATININE 1.27 04/11/2023    CREATININE 1.06 11/30/2022    CREATININE 1.34 (H) 11/29/2022       Cardiac Enzymes:    Lab Results   Component Value Date    TROPHS 34 (H) 11/29/2022    TROPHS 34 (H) 11/29/2022    TROPHS 39 (H) 11/29/2022       Hepatic Function Panel:    Lab Results   Component Value Date    ALKPHOS 91 11/29/2022    ALT 20 11/29/2022    AST 33 11/29/2022    PROT 7.2 11/29/2022    BILITOT 1.0 11/29/2022         Diagnostic Studies:     No recent cardiovascular testing.    Radiology:     No orders to display     ASSESSMENT     Problem List Items Addressed This Visit        Atherosclerosis of native coronary artery without angina pectoris - Primary    Relevant Orders    Comprehensive Metabolic Panel    Lipid Panel    Chronic atrial fibrillation (Multi)    Relevant Orders    Comprehensive Metabolic Panel    Fatigue    Relevant Orders    Comprehensive Metabolic Panel    CBC and Auto Differential    Thyroid Stimulating Hormone    Hyperlipidemia    Relevant Orders    Lipid Panel    Hypokalemia    Ventricular tachycardia, nonsustained (Multi)    Relevant Orders    Magnesium    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  Coronary artery disease stable without angina pectoris.  Will continue conservative medical therapy and risk factor modification.  2.  Chronic combined systolic and diastolic congestive heart failure.  Currently well compensated at his last visit we did have a BN peptide that showed no significant heart failure decompensation.  Will continue his afterload reduction as tolerated by his blood pressure and he is on low-dose therapy but seems to be doing well with that.  Seems to get in more trouble when he becomes volume overloaded.  Will check his labs including his electrolytes and CBC.  3.  Chronic atrial fibrillation on chronic anticoagulation.  Will check his renal function for his medication dosing as well as a CBC to make sure he has not become anemic.  4.  Fatigue.  Given his fatigue in addition to check his electrolytes and CBC we will also check a thyroid status.  5.  Hyperlipidemia.  Patient is on statin therapy we will check his lipid panel and transaminases.  Will adjust his medication as clinically appropriate.  6.  History of hypokalemia and ventricular tachycardia.  No recurrence of arrhythmias but will check his potassium and magnesium.    Will see the patient back in  3 months for follow-up to reassess.  I believe some of his decompensation has been from a gradual and progressive drop in any type of regular physical exercise.  We have talked about a low impact walking program or exercise program at 10 to 15 minutes a day to try to build up some of his stamina.  If we do not see any improvement when he returns to see us in 3 months we will consider repeat cardiovascular evaluation.

## 2024-09-10 ENCOUNTER — TELEPHONE (OUTPATIENT)
Dept: CARDIOLOGY | Facility: CLINIC | Age: 84
End: 2024-09-10
Payer: MEDICARE

## 2024-09-10 NOTE — TELEPHONE ENCOUNTER
----- Message from Nurse Maria RIVAS sent at 9/5/2024  4:19 PM EDT -----    ----- Message -----  From: Maria D Wilson MD  Sent: 9/5/2024   3:52 PM EDT  To: Maria Sanders LPN    Blood counts are normal, so shortness of breath is not coming from anemia.  Other labs still pending  ----- Message -----  From: Lab, Background User  Sent: 9/5/2024   3:36 PM EDT  To: Maria D Wilson MD

## 2024-09-11 ENCOUNTER — TELEPHONE (OUTPATIENT)
Dept: CARDIOLOGY | Facility: CLINIC | Age: 84
End: 2024-09-11
Payer: MEDICARE

## 2024-09-11 NOTE — TELEPHONE ENCOUNTER
----- Message from Nurse Maria RIVAS sent at 9/11/2024 10:03 AM EDT -----    ----- Message -----  From: Maria D Wilson MD  Sent: 9/11/2024  12:08 AM EDT  To: Maria Sanders LPN    Labs look ok, lipids ok but not ideal. No change in medication at this time.  ----- Message -----  From: Lab, Background User  Sent: 9/5/2024   3:36 PM EDT  To: Maria D Wilson MD

## 2024-09-19 ENCOUNTER — APPOINTMENT (OUTPATIENT)
Dept: CARDIOLOGY | Facility: CLINIC | Age: 84
End: 2024-09-19
Payer: MEDICARE

## 2024-11-11 DIAGNOSIS — I48.20 CHRONIC ATRIAL FIBRILLATION (MULTI): ICD-10-CM

## 2024-11-11 DIAGNOSIS — I50.20 HEART FAILURE WITH REDUCED EJECTION FRACTION, NYHA CLASS III: ICD-10-CM

## 2024-11-11 RX ORDER — LOSARTAN POTASSIUM 25 MG/1
25 TABLET ORAL DAILY
Qty: 90 TABLET | Refills: 1 | Status: SHIPPED | OUTPATIENT
Start: 2024-11-11 | End: 2025-05-10

## 2024-11-25 ENCOUNTER — TELEPHONE (OUTPATIENT)
Dept: CARDIOLOGY | Facility: CLINIC | Age: 84
End: 2024-11-25
Payer: MEDICARE

## 2024-11-25 NOTE — TELEPHONE ENCOUNTER
Patient is asking for a discount coupon for his Eliqus. He says that he has gotten this from you before. He will be out of this medication in 3 days and needs this coupon to lower this cost. His pharmacy is [facility] & the co-pay is over $200, with the card he says it would be $10.

## 2024-11-28 ENCOUNTER — APPOINTMENT (OUTPATIENT)
Dept: CARDIOLOGY | Facility: HOSPITAL | Age: 84
End: 2024-11-28
Payer: MEDICARE

## 2024-11-28 ENCOUNTER — HOSPITAL ENCOUNTER (EMERGENCY)
Facility: HOSPITAL | Age: 84
Discharge: OTHER INSTITUTION | End: 2024-11-29
Attending: EMERGENCY MEDICINE
Payer: MEDICARE

## 2024-11-28 ENCOUNTER — APPOINTMENT (OUTPATIENT)
Dept: RADIOLOGY | Facility: HOSPITAL | Age: 84
End: 2024-11-28
Payer: MEDICARE

## 2024-11-28 DIAGNOSIS — G93.41 ACUTE METABOLIC ENCEPHALOPATHY: ICD-10-CM

## 2024-11-28 DIAGNOSIS — T83.511A URINARY TRACT INFECTION ASSOCIATED WITH INDWELLING URETHRAL CATHETER, INITIAL ENCOUNTER (CMS-HCC): Primary | ICD-10-CM

## 2024-11-28 DIAGNOSIS — N39.0 URINARY TRACT INFECTION ASSOCIATED WITH INDWELLING URETHRAL CATHETER, INITIAL ENCOUNTER (CMS-HCC): Primary | ICD-10-CM

## 2024-11-28 LAB
ALBUMIN SERPL BCP-MCNC: 4 G/DL (ref 3.4–5)
ALP SERPL-CCNC: 74 U/L (ref 33–136)
ALT SERPL W P-5'-P-CCNC: 27 U/L (ref 10–52)
ANION GAP BLDV CALCULATED.4IONS-SCNC: 9 MMOL/L (ref 10–25)
ANION GAP SERPL CALC-SCNC: 13 MMOL/L
APPEARANCE UR: CLEAR
AST SERPL W P-5'-P-CCNC: 59 U/L (ref 9–39)
BACTERIA #/AREA URNS AUTO: ABNORMAL /HPF
BASE EXCESS BLDV CALC-SCNC: 2.3 MMOL/L (ref -2–3)
BASOPHILS # BLD AUTO: 0.04 X10*3/UL (ref 0–0.1)
BASOPHILS NFR BLD AUTO: 0.3 %
BILIRUB SERPL-MCNC: 1 MG/DL (ref 0–1.2)
BILIRUB UR STRIP.AUTO-MCNC: NEGATIVE MG/DL
BODY TEMPERATURE: ABNORMAL
BUN SERPL-MCNC: 24 MG/DL (ref 6–23)
CA-I BLDV-SCNC: 1.15 MMOL/L (ref 1.1–1.33)
CALCIUM SERPL-MCNC: 9 MG/DL (ref 8.6–10.3)
CARDIAC TROPONIN I PNL SERPL HS: 32 NG/L (ref 0–20)
CARDIAC TROPONIN I PNL SERPL HS: 45 NG/L (ref 0–20)
CHLORIDE BLDV-SCNC: 99 MMOL/L (ref 98–107)
CHLORIDE SERPL-SCNC: 97 MMOL/L (ref 98–107)
CO2 SERPL-SCNC: 24 MMOL/L (ref 21–32)
COLOR UR: ABNORMAL
CREAT SERPL-MCNC: 1.4 MG/DL (ref 0.5–1.3)
EGFRCR SERPLBLD CKD-EPI 2021: 49 ML/MIN/1.73M*2
EOSINOPHIL # BLD AUTO: 0.04 X10*3/UL (ref 0–0.4)
EOSINOPHIL NFR BLD AUTO: 0.3 %
ERYTHROCYTE [DISTWIDTH] IN BLOOD BY AUTOMATED COUNT: 13.2 % (ref 11.5–14.5)
GLUCOSE BLDV-MCNC: 113 MG/DL (ref 74–99)
GLUCOSE SERPL-MCNC: 107 MG/DL (ref 74–99)
GLUCOSE UR STRIP.AUTO-MCNC: NORMAL MG/DL
HCO3 BLDV-SCNC: 26.5 MMOL/L (ref 22–26)
HCT VFR BLD AUTO: 46 % (ref 41–52)
HCT VFR BLD EST: 48 % (ref 41–52)
HGB BLD-MCNC: 16.1 G/DL (ref 13.5–17.5)
HGB BLDV-MCNC: 16.1 G/DL (ref 13.5–17.5)
IMM GRANULOCYTES # BLD AUTO: 0.05 X10*3/UL (ref 0–0.5)
IMM GRANULOCYTES NFR BLD AUTO: 0.4 % (ref 0–0.9)
INHALED O2 CONCENTRATION: 21 %
KETONES UR STRIP.AUTO-MCNC: NEGATIVE MG/DL
LACTATE BLDV-SCNC: 1.8 MMOL/L (ref 0.4–2)
LACTATE SERPL-SCNC: 1.8 MMOL/L (ref 0.4–2)
LEUKOCYTE ESTERASE UR QL STRIP.AUTO: ABNORMAL
LIPASE SERPL-CCNC: 1117 U/L (ref 9–82)
LYMPHOCYTES # BLD AUTO: 0.47 X10*3/UL (ref 0.8–3)
LYMPHOCYTES NFR BLD AUTO: 3.8 %
MCH RBC QN AUTO: 32 PG (ref 26–34)
MCHC RBC AUTO-ENTMCNC: 35 G/DL (ref 32–36)
MCV RBC AUTO: 92 FL (ref 80–100)
MONOCYTES # BLD AUTO: 0.61 X10*3/UL (ref 0.05–0.8)
MONOCYTES NFR BLD AUTO: 4.9 %
MUCOUS THREADS #/AREA URNS AUTO: ABNORMAL /LPF
NEUTROPHILS # BLD AUTO: 11.27 X10*3/UL (ref 1.6–5.5)
NEUTROPHILS NFR BLD AUTO: 90.3 %
NITRITE UR QL STRIP.AUTO: NEGATIVE
NRBC BLD-RTO: 0 /100 WBCS (ref 0–0)
OXYHGB MFR BLDV: 51.4 % (ref 45–75)
PCO2 BLDV: 39 MM HG (ref 41–51)
PH BLDV: 7.44 PH (ref 7.33–7.43)
PH UR STRIP.AUTO: 5.5 [PH]
PLATELET # BLD AUTO: 215 X10*3/UL (ref 150–450)
PO2 BLDV: 31 MM HG (ref 35–45)
POTASSIUM BLDV-SCNC: 5.1 MMOL/L (ref 3.5–5.3)
POTASSIUM SERPL-SCNC: 5.1 MMOL/L (ref 3.5–5.3)
PROT SERPL-MCNC: 7.8 G/DL (ref 6.4–8.2)
PROT UR STRIP.AUTO-MCNC: ABNORMAL MG/DL
RBC # BLD AUTO: 5.03 X10*6/UL (ref 4.5–5.9)
RBC # UR STRIP.AUTO: ABNORMAL /UL
RBC #/AREA URNS AUTO: ABNORMAL /HPF
SAO2 % BLDV: 52 % (ref 45–75)
SODIUM BLDV-SCNC: 129 MMOL/L (ref 136–145)
SODIUM SERPL-SCNC: 129 MMOL/L (ref 136–145)
SP GR UR STRIP.AUTO: 1.02
UROBILINOGEN UR STRIP.AUTO-MCNC: NORMAL MG/DL
WBC # BLD AUTO: 12.5 X10*3/UL (ref 4.4–11.3)
WBC #/AREA URNS AUTO: ABNORMAL /HPF

## 2024-11-28 PROCEDURE — 84132 ASSAY OF SERUM POTASSIUM: CPT | Performed by: STUDENT IN AN ORGANIZED HEALTH CARE EDUCATION/TRAINING PROGRAM

## 2024-11-28 PROCEDURE — 84484 ASSAY OF TROPONIN QUANT: CPT | Performed by: STUDENT IN AN ORGANIZED HEALTH CARE EDUCATION/TRAINING PROGRAM

## 2024-11-28 PROCEDURE — 2500000004 HC RX 250 GENERAL PHARMACY W/ HCPCS (ALT 636 FOR OP/ED): Performed by: STUDENT IN AN ORGANIZED HEALTH CARE EDUCATION/TRAINING PROGRAM

## 2024-11-28 PROCEDURE — 2500000001 HC RX 250 WO HCPCS SELF ADMINISTERED DRUGS (ALT 637 FOR MEDICARE OP)

## 2024-11-28 PROCEDURE — 87040 BLOOD CULTURE FOR BACTERIA: CPT | Mod: STJLAB | Performed by: STUDENT IN AN ORGANIZED HEALTH CARE EDUCATION/TRAINING PROGRAM

## 2024-11-28 PROCEDURE — 81001 URINALYSIS AUTO W/SCOPE: CPT | Performed by: STUDENT IN AN ORGANIZED HEALTH CARE EDUCATION/TRAINING PROGRAM

## 2024-11-28 PROCEDURE — 85025 COMPLETE CBC W/AUTO DIFF WBC: CPT | Performed by: STUDENT IN AN ORGANIZED HEALTH CARE EDUCATION/TRAINING PROGRAM

## 2024-11-28 PROCEDURE — 36415 COLL VENOUS BLD VENIPUNCTURE: CPT | Performed by: STUDENT IN AN ORGANIZED HEALTH CARE EDUCATION/TRAINING PROGRAM

## 2024-11-28 PROCEDURE — 96365 THER/PROPH/DIAG IV INF INIT: CPT

## 2024-11-28 PROCEDURE — 70450 CT HEAD/BRAIN W/O DYE: CPT

## 2024-11-28 PROCEDURE — 87075 CULTR BACTERIA EXCEPT BLOOD: CPT | Mod: 59,STJLAB | Performed by: STUDENT IN AN ORGANIZED HEALTH CARE EDUCATION/TRAINING PROGRAM

## 2024-11-28 PROCEDURE — 83605 ASSAY OF LACTIC ACID: CPT | Performed by: STUDENT IN AN ORGANIZED HEALTH CARE EDUCATION/TRAINING PROGRAM

## 2024-11-28 PROCEDURE — 99285 EMERGENCY DEPT VISIT HI MDM: CPT | Performed by: EMERGENCY MEDICINE

## 2024-11-28 PROCEDURE — 83690 ASSAY OF LIPASE: CPT | Performed by: STUDENT IN AN ORGANIZED HEALTH CARE EDUCATION/TRAINING PROGRAM

## 2024-11-28 PROCEDURE — 87636 SARSCOV2 & INF A&B AMP PRB: CPT | Performed by: STUDENT IN AN ORGANIZED HEALTH CARE EDUCATION/TRAINING PROGRAM

## 2024-11-28 PROCEDURE — 70450 CT HEAD/BRAIN W/O DYE: CPT | Performed by: SURGERY

## 2024-11-28 PROCEDURE — 99285 EMERGENCY DEPT VISIT HI MDM: CPT | Mod: 25

## 2024-11-28 PROCEDURE — 93005 ELECTROCARDIOGRAM TRACING: CPT

## 2024-11-28 PROCEDURE — 87086 URINE CULTURE/COLONY COUNT: CPT | Mod: STJLAB | Performed by: STUDENT IN AN ORGANIZED HEALTH CARE EDUCATION/TRAINING PROGRAM

## 2024-11-28 RX ORDER — ACETAMINOPHEN 325 MG/1
650 TABLET ORAL ONCE
Status: COMPLETED | OUTPATIENT
Start: 2024-11-28 | End: 2024-11-28

## 2024-11-28 RX ORDER — CEFTRIAXONE 2 G/50ML
2 INJECTION, SOLUTION INTRAVENOUS ONCE
Status: COMPLETED | OUTPATIENT
Start: 2024-11-28 | End: 2024-11-29

## 2024-11-28 RX ORDER — ACETAMINOPHEN 325 MG/1
TABLET ORAL
Status: COMPLETED
Start: 2024-11-28 | End: 2024-11-28

## 2024-11-28 ASSESSMENT — PAIN - FUNCTIONAL ASSESSMENT: PAIN_FUNCTIONAL_ASSESSMENT: 0-10

## 2024-11-28 ASSESSMENT — COLUMBIA-SUICIDE SEVERITY RATING SCALE - C-SSRS
6. HAVE YOU EVER DONE ANYTHING, STARTED TO DO ANYTHING, OR PREPARED TO DO ANYTHING TO END YOUR LIFE?: NO
2. HAVE YOU ACTUALLY HAD ANY THOUGHTS OF KILLING YOURSELF?: NO
1. IN THE PAST MONTH, HAVE YOU WISHED YOU WERE DEAD OR WISHED YOU COULD GO TO SLEEP AND NOT WAKE UP?: NO

## 2024-11-28 ASSESSMENT — PAIN SCALES - GENERAL
PAINLEVEL_OUTOF10: 0 - NO PAIN
PAINLEVEL_OUTOF10: 0 - NO PAIN

## 2024-11-28 NOTE — ED PROVIDER NOTES
"EMERGENCY DEPARTMENT ENCOUNTER      Pt Name: Sravan Morin  MRN: [id number]  Birthdate 1940  Date of evaluation: 11/28/2024  Provider: Cynthia Cardenas MD    CHIEF COMPLAINT       Chief Complaint   Patient presents with    UTI     Pts family thinks he may have a UTI         HISTORY OF PRESENT ILLNESS    An 84-year-old male with past medical history of chronic UTI (on Bactrim and Nitrofurantoin), chronic indwelling ram cath, unspecified a fib. (on Eliquis) and NSTEMI w/ stent (x1) comes in due to concerns of worsening UTI.  Family members reported that he is having a \"hard time walking\" and general weakness since today as well as altered mental status.  Patient reported 1 episode of vomiting hour ago and stated that \"ram cath hurts\". Denies any blood in urine, fevers, burning sensation, genital discharge, no chest/abdominal/suprapubic pain.  Last Ram cath replacement was 3 days ago when it started to hurt as a new symptom. He is being treated with nitrofurantoin for a \"couple of years\" and Bactrim that was started yesterday for his chronic UTI.      History provided by:  Relative and patient      Nursing Notes were reviewed.    PAST MEDICAL HISTORY     Past Medical History:   Diagnosis Date    Benign essential hypertension 09/21/2023    Dizziness and giddiness 04/04/2022    Orthostatic dizziness    Obesity, unspecified 02/11/2022    Class 1 obesity with body mass index (BMI) of 31.0 to 31.9 in adult    Other forms of angina pectoris     Atypical angina    Other specified health status     No pertinent past medical history    Personal history of other diseases of the circulatory system 03/14/2022    History of hypotension    Personal history of other specified conditions 01/24/2022    History of orthopnea    Shortness of breath 02/11/2022    Shortness of breath at rest    Shortness of breath 04/04/2022    Shortness of breath on exertion         SURGICAL HISTORY       Past Surgical History: "   Procedure Laterality Date    OTHER SURGICAL HISTORY  11/12/2021    Cataract surgery    OTHER SURGICAL HISTORY  11/12/2021    Colonoscopy    OTHER SURGICAL HISTORY  11/12/2021    Tonsillectomy         CURRENT MEDICATIONS       Previous Medications    APIXABAN (ELIQUIS) 5 MG TABLET    Take 1 tablet (5 mg) by mouth every 12 hours.    CLOPIDOGREL (PLAVIX) 75 MG TABLET    Take 1 tablet (75 mg) by mouth once daily.    FAMOTIDINE (PEPCID ORAL)    Take 1 tablet by mouth once daily.    L. ACIDOPHILUS/BIFID. ANIMALIS 32 BILLION CELL CAPSULE    Take 1 capsule by mouth. PER DIRECTED    LOSARTAN (COZAAR) 25 MG TABLET    Take 1 tablet (25 mg) by mouth once daily.    MELATONIN 10 MG TABLET    Take 1 tablet (10 mg) by mouth once daily at bedtime.    NITROFURANTOIN (MACRODANTIN) 50 MG CAPSULE    Take 1 capsule (50 mg) by mouth once daily at bedtime. TAKE PER DIRECTED    NITROGLYCERIN (NITROSTAT) 0.4 MG SL TABLET    Place 1 tablet (0.4 mg) under the tongue every 5 minutes if needed. CALL 911 IF PERSISTS   PER DIRECTED    SERTRALINE (ZOLOFT) 100 MG TABLET    Take 1 tablet (100 mg) by mouth once daily in the morning.    SIMVASTATIN (ZOCOR) 20 MG TABLET    Take 1 tablet (20 mg) by mouth once daily at bedtime.    SPIRONOLACTONE (ALDACTONE) 25 MG TABLET    Take 1 tablet (25 mg) by mouth once daily.    TAMSULOSIN (FLOMAX) 0.4 MG 24 HR CAPSULE    Take 1 capsule (0.4 mg) by mouth once daily. TAKE PER DIRECTED    TORSEMIDE (DEMADEX) 20 MG TABLET    Take 1 tablet (20 mg) by mouth once daily. Take 1 tablet (20 mg) by mouth once daily. May take 2nd dose daily for > 2 lb weight gain or worsening lower extremity edema.       ALLERGIES     Penicillins and Sulfamethoxazole-trimethoprim    FAMILY HISTORY       Family History   Problem Relation Name Age of Onset    Other (CARDIAC DISORDER) Mother      Coronary artery disease Mother      Other (CARDIAC DISORDER) Father      Coronary artery disease Father      Other (PTCA) Father      Stroke Sister             SOCIAL HISTORY       Social History     Socioeconomic History    Marital status: Single   Tobacco Use    Smoking status: Never    Smokeless tobacco: Current     Types: Snuff   Substance and Sexual Activity    Alcohol use: Not Currently     Comment: social    Drug use: Not Currently    Sexual activity: Defer       SCREENINGS                        PHYSICAL EXAM    (up to 7 for level 4, 8 or more for level 5)     ED Triage Vitals [11/28/24 1747]   Temperature Heart Rate Respirations BP   37.6 °C (99.7 °F) 81 20 159/84      Pulse Ox Temp Source Heart Rate Source Patient Position   97 % Temporal Monitor Sitting      BP Location FiO2 (%)     Right arm --       Physical Exam  Constitutional:       General: He is not in acute distress.     Appearance: He is obese. He is not ill-appearing, toxic-appearing or diaphoretic.   HENT:      Head: Normocephalic and atraumatic.      Nose: Nose normal.      Mouth/Throat:      Mouth: Mucous membranes are moist.   Eyes:      Extraocular Movements: Extraocular movements intact.      Pupils: Pupils are equal, round, and reactive to light.   Cardiovascular:      Rate and Rhythm: Normal rate. Rhythm irregular.      Pulses: Normal pulses.      Heart sounds: Normal heart sounds.   Pulmonary:      Effort: Pulmonary effort is normal.      Breath sounds: Normal breath sounds.   Abdominal:      General: Bowel sounds are normal.      Tenderness: There is no abdominal tenderness. There is no guarding or rebound.      Comments: Firm but depressible   Genitourinary:     Penis: Normal.       Comments: Alfosno cath in place with no blood or discharge. Erythema on the urethral opening.   Musculoskeletal:         General: No swelling. Normal range of motion.      Cervical back: Normal range of motion. No rigidity or tenderness.   Skin:     General: Skin is warm.      Coloration: Skin is not jaundiced.      Findings: No rash.   Neurological:      General: No focal deficit present.      Mental  Status: He is alert.      Cranial Nerves: No cranial nerve deficit.      Motor: No weakness.      Comments: Oriented x2. GCS 14.    Psychiatric:         Mood and Affect: Mood normal.         Behavior: Behavior normal.          DIAGNOSTIC RESULTS     LABS:  Labs Reviewed - No data to display    All other labs were within normal range or not returned as of this dictation.    Imaging  No orders to display        Procedures  Procedures     EMERGENCY DEPARTMENT COURSE/MDM:     Diagnoses as of 11/29/24 0147   Urinary tract infection associated with indwelling urethral catheter, initial encounter (CMS-HCC)   Acute metabolic encephalopathy        Medical Decision Making    A 84-year-old male who comes with acute on chronic  UTI and metabolic encephalopathy. CT head with no evidence of infarct or intracranial hemorrhage.   UA showing evidence of UTI. EKG showing chronic atrial fibrillation. Trops 32->45. Lipase 1,117.   CBC with mild leukocytosis. CMP remarkable for mild hyponatremia Na+ 129 and borderline SUDHIR.   Patient is tolerating water/ fluids and food.  On reassessment patient is febrile and Tylenol was given with improvement of temperature and he was started on Rocephin 2g as well.  Med rec was ordered.  Results/findings and transfer plan with risk and benefits are discussed with patient and daughter at bedside.  Patient is transferred to [facility] under the service of Dr. Malin, who accepted the transfer. A report about this case was done via telephone to the attending physician (Dr. Malin).   Patient and or family in agreement and understanding of treatment plan.  All questions answered.      I reviewed the case with the attending ED physician. The attending ED physician agrees with the plan. Patient and/or patient´s representative was counseled regarding labs, imaging, likely diagnosis, and plan. All questions were answered.    ED Medications administered this visit:  Medications - No data to display    New  Prescriptions from this visit:    New Prescriptions    No medications on file       Follow-up:  No follow-up provider specified.      Final Impression: No diagnosis found.      (Please note that portions of this note were completed with a voice recognition program.  Efforts were made to edit the dictations but occasionally words are mis-transcribed.)     Cynthia Cardenas MD  Resident  11/29/24 0143       Cynthia Cardenas MD  Resident  11/29/24 0145       Cynthia Cardenas MD  Resident  11/29/24 0148

## 2024-11-29 VITALS
WEIGHT: 185 LBS | TEMPERATURE: 97.3 F | BODY MASS INDEX: 31.58 KG/M2 | SYSTOLIC BLOOD PRESSURE: 102 MMHG | OXYGEN SATURATION: 95 % | DIASTOLIC BLOOD PRESSURE: 61 MMHG | RESPIRATION RATE: 18 BRPM | HEIGHT: 64 IN | HEART RATE: 83 BPM

## 2024-11-29 LAB
FLUAV RNA RESP QL NAA+PROBE: NOT DETECTED
FLUBV RNA RESP QL NAA+PROBE: NOT DETECTED
HOLD SPECIMEN: NORMAL
SARS-COV-2 RNA RESP QL NAA+PROBE: NOT DETECTED

## 2024-11-29 PROCEDURE — 2500000001 HC RX 250 WO HCPCS SELF ADMINISTERED DRUGS (ALT 637 FOR MEDICARE OP): Performed by: STUDENT IN AN ORGANIZED HEALTH CARE EDUCATION/TRAINING PROGRAM

## 2024-11-29 PROCEDURE — 2500000002 HC RX 250 W HCPCS SELF ADMINISTERED DRUGS (ALT 637 FOR MEDICARE OP, ALT 636 FOR OP/ED): Performed by: STUDENT IN AN ORGANIZED HEALTH CARE EDUCATION/TRAINING PROGRAM

## 2024-11-29 RX ORDER — TAMSULOSIN HYDROCHLORIDE 0.4 MG/1
0.4 CAPSULE ORAL ONCE
Status: COMPLETED | OUTPATIENT
Start: 2024-11-29 | End: 2024-11-29

## 2024-11-29 RX ORDER — NITROFURANTOIN MACROCRYSTALS 50 MG/1
50 CAPSULE ORAL ONCE
Status: COMPLETED | OUTPATIENT
Start: 2024-11-29 | End: 2024-11-29

## 2024-11-29 RX ORDER — SPIRONOLACTONE 25 MG/1
25 TABLET ORAL ONCE
Status: COMPLETED | OUTPATIENT
Start: 2024-11-29 | End: 2024-11-29

## 2024-11-29 RX ORDER — SIMVASTATIN 20 MG/1
20 TABLET, FILM COATED ORAL ONCE
Status: COMPLETED | OUTPATIENT
Start: 2024-11-29 | End: 2024-11-29

## 2024-11-29 ASSESSMENT — PAIN - FUNCTIONAL ASSESSMENT: PAIN_FUNCTIONAL_ASSESSMENT: 0-10

## 2024-11-29 ASSESSMENT — PAIN SCALES - GENERAL: PAINLEVEL_OUTOF10: 0 - NO PAIN

## 2024-11-30 LAB
ATRIAL RATE: 86 BPM
Q ONSET: 210 MS
QRS COUNT: 13 BEATS
QRS DURATION: 130 MS
QT INTERVAL: 398 MS
QTC CALCULATION(BAZETT): 438 MS
QTC FREDERICIA: 425 MS
R AXIS: -57 DEGREES
T AXIS: 87 DEGREES
T OFFSET: 409 MS
VENTRICULAR RATE: 73 BPM

## 2024-12-01 LAB
BACTERIA BLD CULT: NORMAL
BACTERIA BLD CULT: NORMAL
BACTERIA UR CULT: ABNORMAL

## 2024-12-02 ENCOUNTER — TELEPHONE (OUTPATIENT)
Dept: PHARMACY | Facility: HOSPITAL | Age: 84
End: 2024-12-02
Payer: MEDICARE

## 2024-12-02 NOTE — PROGRESS NOTES
EDPD Note: Lab/Chart Reviewed    Reviewed Mr./Mrs./Ms. Sravan Morin 's chart regarding a positive urine culture/result that was taken during their recent emergency room visit. The patient was admitted to [facility]  .Therefore, I have faxed this information to [facility] at fax number [phone] .    Susceptibility data from last 90 days.  Collected Specimen Info Organism Amoxicillin/Clavulanate Ampicillin Ampicillin/Sulbactam Aztreonam Cefazolin Cefazolin (uncomplicated UTIs only) Cefepime Cefotaxime Ceftazidime Ceftriaxone Ciprofloxacin Ertapenem   11/28/24 Urine from Indwelling (Alfonso) Catheter Escherichia coli  S  R  S  R  R  R  R  R  R  R  R  S     Collected Specimen Info Organism Gentamicin Meropenem Nitrofurantoin Piperacillin/Tazobactam Trimethoprim/Sulfamethoxazole   11/28/24 Urine from Indwelling (Alfonso) Catheter Escherichia coli  S  S  S  S  R       No further follow up needed from EDPD Team.     Thlucy T David, RP

## 2024-12-03 LAB
BACTERIA BLD CULT: NORMAL
BACTERIA BLD CULT: NORMAL

## 2024-12-06 ENCOUNTER — OFFICE VISIT (OUTPATIENT)
Dept: CARDIOLOGY | Facility: CLINIC | Age: 84
End: 2024-12-06
Payer: MEDICARE

## 2024-12-06 ENCOUNTER — APPOINTMENT (OUTPATIENT)
Dept: CARDIOLOGY | Facility: CLINIC | Age: 84
End: 2024-12-06
Payer: MEDICARE

## 2024-12-06 VITALS
HEART RATE: 72 BPM | BODY MASS INDEX: 31.41 KG/M2 | SYSTOLIC BLOOD PRESSURE: 96 MMHG | WEIGHT: 184 LBS | DIASTOLIC BLOOD PRESSURE: 62 MMHG | HEIGHT: 64 IN

## 2024-12-06 DIAGNOSIS — I25.10 ATHEROSCLEROSIS OF NATIVE CORONARY ARTERY OF NATIVE HEART WITHOUT ANGINA PECTORIS: ICD-10-CM

## 2024-12-06 DIAGNOSIS — I50.42 CHRONIC COMBINED SYSTOLIC AND DIASTOLIC HEART FAILURE: Primary | ICD-10-CM

## 2024-12-06 DIAGNOSIS — I95.89 IATROGENIC HYPOTENSION: ICD-10-CM

## 2024-12-06 DIAGNOSIS — I48.20 CHRONIC ATRIAL FIBRILLATION (MULTI): ICD-10-CM

## 2024-12-06 DIAGNOSIS — E78.2 MIXED HYPERLIPIDEMIA: ICD-10-CM

## 2024-12-06 DIAGNOSIS — I50.20 HEART FAILURE WITH REDUCED EJECTION FRACTION, NYHA CLASS III: ICD-10-CM

## 2024-12-06 DIAGNOSIS — Z79.01 CHRONIC ANTICOAGULATION: ICD-10-CM

## 2024-12-06 DIAGNOSIS — I95.1 CHRONIC ORTHOSTATIC HYPOTENSION: ICD-10-CM

## 2024-12-06 PROCEDURE — 4004F PT TOBACCO SCREEN RCVD TLK: CPT | Performed by: INTERNAL MEDICINE

## 2024-12-06 PROCEDURE — 1160F RVW MEDS BY RX/DR IN RCRD: CPT | Performed by: INTERNAL MEDICINE

## 2024-12-06 PROCEDURE — 99214 OFFICE O/P EST MOD 30 MIN: CPT | Performed by: INTERNAL MEDICINE

## 2024-12-06 PROCEDURE — 1159F MED LIST DOCD IN RCRD: CPT | Performed by: INTERNAL MEDICINE

## 2024-12-06 PROCEDURE — G2211 COMPLEX E/M VISIT ADD ON: HCPCS | Performed by: INTERNAL MEDICINE

## 2024-12-06 PROCEDURE — 1157F ADVNC CARE PLAN IN RCRD: CPT | Performed by: INTERNAL MEDICINE

## 2024-12-06 RX ORDER — TORSEMIDE 20 MG/1
20 TABLET ORAL 2 TIMES DAILY
Start: 2024-12-06 | End: 2025-12-06

## 2024-12-06 RX ORDER — LOSARTAN POTASSIUM 25 MG/1
25 TABLET ORAL DAILY
Qty: 90 TABLET | Refills: 3 | Status: SHIPPED | OUTPATIENT
Start: 2024-12-06 | End: 2025-12-06

## 2024-12-06 RX ORDER — METOPROLOL SUCCINATE 25 MG/1
25 TABLET, EXTENDED RELEASE ORAL DAILY
COMMUNITY

## 2024-12-06 ASSESSMENT — ENCOUNTER SYMPTOMS
ACTIVITY CHANGE: 1
COUGH: 0
DIZZINESS: 0
LIGHT-HEADEDNESS: 1
FATIGUE: 1
PALPITATIONS: 0
BRUISES/BLEEDS EASILY: 1
APPETITE CHANGE: 1
CONFUSION: 1
SHORTNESS OF BREATH: 1
ARTHRALGIAS: 1

## 2024-12-06 NOTE — PROGRESS NOTES
Patient:  Sravan Morin  YOB: 1940  MRN: [id number]       Chief Complaint/Active Symptoms:       Sravan Morin is a 84 y.o. male who returns today for cardiac follow-up.    Patient DC from [facility] yesterday for UTI. Treated for UTI but concerns for pancreatitis with elevated lipase. Saw GI and infectious disease doctor while at [facility]. No cardiac testing per family.  Just discharged from [facility] yesterday.  Discharge med rec was reviewed and there were several medication changes that were noted.  1 was that they restarted beta-blockers metoprolol succinate 25 mg daily.  The other was that they restarted his losartan 25 mg daily and increased his torsemide to twice daily.    Patient feels tired. Had some wheezing while at  that responded to respiratory treatment. Was DC with congestion and cough.  He has some lightheadedness with standing but overall when he feels after he for stands up he is not felt lightheaded nor is he had any syncope or falls.  He has some mild dyspnea on exertion but no orthopnea or PND and has not had any lower extremity edema over the past week.    Review of Systems   Constitutional:  Positive for activity change, appetite change and fatigue.   HENT:  Positive for hearing loss.    Respiratory:  Positive for shortness of breath. Negative for cough.    Cardiovascular:  Negative for chest pain, palpitations and leg swelling.   Musculoskeletal:  Positive for arthralgias.   Neurological:  Positive for light-headedness. Negative for dizziness.   Hematological:  Bruises/bleeds easily.   Psychiatric/Behavioral:  Positive for confusion.    All other systems reviewed and are negative.      Objective:     Vitals:    12/06/24 1424   BP: 96/62   Pulse: 72       Vitals:    12/06/24 1424   Weight: 83.5 kg (184 lb)       Allergies:     Allergies   Allergen Reactions    Penicillins Hives     70 years ago    Sulfamethoxazole-Trimethoprim Other     Caused liver damage           Medications:     Current Outpatient Medications   Medication Instructions    apixaban (ELIQUIS) 5 mg, oral, Every 12 hours    clopidogrel (Plavix) 75 mg tablet 1 tablet, Daily    famotidine (PEPCID ORAL) 1 tablet, Daily    L. acidophilus/Bifid. animalis 32 billion cell capsule 1 capsule    losartan (COZAAR) 25 mg, oral, Daily    melatonin 10 mg, Nightly    nitrofurantoin (MACRODANTIN) 50 mg, Nightly    nitroglycerin (NITROSTAT) 0.4 mg, Every 5 min PRN    sertraline (ZOLOFT) 100 mg, Every morning    simvastatin (ZOCOR) 20 mg, Nightly    spironolactone (ALDACTONE) 25 mg, oral, Daily    tamsulosin (FLOMAX) 0.4 mg, Daily    torsemide (DEMADEX) 20 mg, oral, Daily, Take 1 tablet (20 mg) by mouth once daily. May take 2nd dose daily for > 2 lb weight gain or worsening lower extremity edema.       Physical Examination:   GENERAL:  Well developed, well nourished, in no acute distress.  HEENT: NC AT, EOMI with anicteric sclera  NECK:   no elevated JVD, no bruit.  CHEST:  Symmetric and nontender.  LUNGS: Nonlabored respirations diminished at the bases with a few scattered rhonchi no rales  HEART: PMI is not displaced.  There is a regular rhythm occasionally irregular with a normal S1 and S2 no S3.  Soft holosystolic murmur is heard along the left sternal border no diastolic murmur.  ABDOMEN: Soft, NT, ND without palpable organomegaly or bruits  EXTREMITIES:  Warm with good color, no clubbing or cyanosis.  There is trace edema noted.  MUSCULOSKELETAL: Moderate osteoarthritic changes  NEURO/PSYCH: Alert with some mild confusion.  No focal motor deficits.    Skin: no rash or lesions on exposed skin or reported.    Lab:     CBC:   Lab Results   Component Value Date    WBC 12.5 (H) 11/28/2024    RBC 5.03 11/28/2024    HGB 16.1 11/28/2024    HCT 46.0 11/28/2024     11/28/2024        CMP:    Lab Results   Component Value Date     (L) 11/28/2024    K 5.1 11/28/2024    CL 97 (L) 11/28/2024    CO2 24 11/28/2024    BUN  24 (H) 11/28/2024    CREATININE 1.40 (H) 11/28/2024    GLUCOSE 107 (H) 11/28/2024    CALCIUM 9.0 11/28/2024       Magnesium:    Lab Results   Component Value Date    MG 1.96 09/05/2024       Lipid Profile:    Lab Results   Component Value Date    TRIG 103 09/05/2024    HDL 56.3 09/05/2024    LDLCALC 86 09/05/2024       TSH:    Lab Results   Component Value Date    TSH 1.35 09/05/2024       BNP:   Lab Results   Component Value Date     (H) 04/11/2023        PT/INR:    Lab Results   Component Value Date    PROTIME 15.9 (H) 11/29/2022    INR 1.4 (H) 11/29/2022       HgBA1c:    Lab Results   Component Value Date    HGBA1C 5.2 11/30/2022       BMP:  Lab Results   Component Value Date     (L) 11/28/2024     09/05/2024     04/11/2023     11/30/2022     11/29/2022    K 5.1 11/28/2024    K 4.5 09/05/2024    K 3.8 04/11/2023    K 3.9 11/30/2022    K 3.8 11/29/2022    CL 97 (L) 11/28/2024     09/05/2024     04/11/2023     11/30/2022     11/29/2022    CO2 24 11/28/2024    CO2 27 09/05/2024    CO2 30 04/11/2023    CO2 25 11/30/2022    CO2 28 11/29/2022    BUN 24 (H) 11/28/2024    BUN 20 09/05/2024    BUN 23 04/11/2023    BUN 15 11/30/2022    BUN 19 11/29/2022    CREATININE 1.40 (H) 11/28/2024    CREATININE 1.22 09/05/2024    CREATININE 1.27 04/11/2023    CREATININE 1.06 11/30/2022    CREATININE 1.34 (H) 11/29/2022       Cardiac Enzymes:    Lab Results   Component Value Date    TROPHS 45 (H) 11/28/2024    TROPHS 32 (H) 11/28/2024    TROPHS 34 (H) 11/29/2022       Hepatic Function Panel:    Lab Results   Component Value Date    ALKPHOS 74 11/28/2024    ALT 27 11/28/2024    AST 59 (H) 11/28/2024    PROT 7.8 11/28/2024    BILITOT 1.0 11/28/2024         Diagnostic Studies:     No revolt results from [facility] are able to be seen.  Will request discharge summary  Radiology:     No orders to display     ASSESSMENT     Problem List Items Addressed This Visit        Atherosclerosis of native coronary artery without angina pectoris    Relevant Medications    metoprolol succinate XL (Toprol-XL) 25 mg 24 hr tablet    Chronic atrial fibrillation (Multi)    Relevant Medications    metoprolol succinate XL (Toprol-XL) 25 mg 24 hr tablet    Heart failure with reduced ejection fraction, NYHA class III    Relevant Medications    metoprolol succinate XL (Toprol-XL) 25 mg 24 hr tablet    torsemide (Demadex) 20 mg tablet    Hyperlipidemia    Iatrogenic hypotension    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  Coronary artery disease stable without angina pectoris.  Patient is 2 years out from his coronary intervention.  Will discontinue Plavix and continue Eliquis alone.  2.  Chronic systolic and diastolic congestive heart failure.  Currently compensated on his medical regimen.  Has a borderline blood pressure with orthostatic hypotension so would cautiously continue twice daily torsemide.  Occasionally that is too much for him but unfortunately he never remembers to take the second dose of torsemide if he has leg edema as I have experienced in the past.  Comes in clearly volume overloaded and just not taking the second dose like he has been instructed to.  Because of this I would leave him on his current regimen for now.  3.  Atrial fibrillation on chronic anticoagulation.  Continue the same as long as there is no contraindications.  4.  Iatrogenic hypotension.  This limits the amount of medications we can give the patient for afterload reduction.    Will see him in follow-up in 3 months if he is stable he should return for an earlier appointment if he has any problems.  We requested  an echocardiogram as he has had problems with shortness of breath to see where we stand.  If his heart function is unchanged from previously we will just continue this regimen.

## 2024-12-10 DIAGNOSIS — I50.20 HEART FAILURE WITH REDUCED EJECTION FRACTION, NYHA CLASS III: ICD-10-CM

## 2024-12-16 DIAGNOSIS — I50.20 HEART FAILURE WITH REDUCED EJECTION FRACTION, NYHA CLASS III: ICD-10-CM

## 2024-12-16 RX ORDER — TORSEMIDE 20 MG/1
TABLET ORAL
Qty: 135 TABLET | Refills: 0 | Status: SHIPPED | OUTPATIENT
Start: 2024-12-16 | End: 2024-12-19

## 2024-12-19 RX ORDER — TORSEMIDE 20 MG/1
20 TABLET ORAL SEE ADMIN INSTRUCTIONS
Qty: 135 TABLET | Refills: 1 | Status: SHIPPED | OUTPATIENT
Start: 2024-12-19 | End: 2025-03-19

## 2025-01-06 DIAGNOSIS — I50.20 HEART FAILURE WITH REDUCED EJECTION FRACTION, NYHA CLASS III: ICD-10-CM

## 2025-01-08 RX ORDER — SPIRONOLACTONE 25 MG/1
25 TABLET ORAL DAILY
Qty: 30 TABLET | Refills: 2 | Status: SHIPPED | OUTPATIENT
Start: 2025-01-08

## 2025-02-28 ENCOUNTER — APPOINTMENT (OUTPATIENT)
Dept: CARDIOLOGY | Facility: CLINIC | Age: 85
End: 2025-02-28
Payer: MEDICARE

## 2025-03-07 ENCOUNTER — APPOINTMENT (OUTPATIENT)
Dept: CARDIOLOGY | Facility: CLINIC | Age: 85
End: 2025-03-07
Payer: MEDICARE

## 2025-04-03 DIAGNOSIS — I50.20 HEART FAILURE WITH REDUCED EJECTION FRACTION, NYHA CLASS III: ICD-10-CM

## 2025-04-08 RX ORDER — SPIRONOLACTONE 25 MG/1
25 TABLET ORAL DAILY
Qty: 90 TABLET | Refills: 0 | Status: SHIPPED | OUTPATIENT
Start: 2025-04-08

## 2025-06-03 DIAGNOSIS — I50.20 HEART FAILURE WITH REDUCED EJECTION FRACTION, NYHA CLASS III: ICD-10-CM

## 2025-06-06 RX ORDER — TORSEMIDE 20 MG/1
TABLET ORAL
Qty: 90 TABLET | Refills: 0 | Status: SHIPPED | OUTPATIENT
Start: 2025-06-06 | End: 2025-09-04

## 2025-06-06 NOTE — TELEPHONE ENCOUNTER
Spoke with pt, he is following a cardiologist at [facility] and he does not need the RX he has plenty, he stated.     NO need to fill RX

## 2025-07-02 DIAGNOSIS — I50.20 HEART FAILURE WITH REDUCED EJECTION FRACTION, NYHA CLASS III: ICD-10-CM

## 2025-07-03 RX ORDER — TORSEMIDE 20 MG/1
TABLET ORAL
Qty: 90 TABLET | Refills: 1 | OUTPATIENT
Start: 2025-07-03